# Patient Record
Sex: MALE | Race: WHITE
[De-identification: names, ages, dates, MRNs, and addresses within clinical notes are randomized per-mention and may not be internally consistent; named-entity substitution may affect disease eponyms.]

---

## 2018-12-06 NOTE — EDPHY
H & P


Stated Complaint: fall from bed-LBP


Time Seen by Provider: 12/06/18 06:12


HPI/ROS: 





HPI: The patient presents with a fall out of bed which occurred just prior to 

arrival.  He is brought in by ambulance from [facility].  He has multiple 

medical problems and was getting out of bed.  He slipped and fell and landed on 

his lumbar spine.  He had acute onset of severe pain which is achy, constant, 

midline.  He denies any numbness or tingling of his legs.  He normally gets 

around without any assistance though is deconditioned it seems.  Paramedics 

gave him 400 mcg of fentanyl which cause respiratory depression and sedation 

for which she required Narcan.





REVIEW OF SYSTEMS


10 systems were reviewed and negative with the exception of the elements 

mentioned in the history of present illness.





PMHx:  CHF, obstructive sleep apnea, on 2 L of oxygen at all times





TRAUMA PHYSICAL


General Appearance: Alert, no distress


Head: Atraumatic


Eyes: Pupils equal, round, reactive


Respiratory: No chest wall tenderness, no subcutaneous air, lungs clear 

bilaterally


Cardiovascular:  Regular rate and rhythm 


Abdomen:  Abdomen is soft and non-tender, pelvis stable


Skin:  No lacerations, No abrasion


Back:  Diffuse tenderness throughout the lumbar spine, paraspinal regions 

bilaterally


Extremities:  Non-tender, full range of motion 


Neurological:  A&Ox3, GCS=15,normal motor function with 5/5 strength in all 4 

extremities, normal sensory exam





Source: Patient, EMS, Old records


Exam Limitations: No limitations





- Personal History


Current Tetanus Diphtheria and Acellular Pertussis (TDAP): Unsure





- Medical/Surgical History


Hx Asthma: No


Hx Chronic Respiratory Disease: Yes


Hx Diabetes: No


Hx Cardiac Disease: Yes


Hx Renal Disease: No


Hx Cirrhosis: No


Hx Alcoholism: No


Hx HIV/AIDS: No


Hx Splenectomy or Spleen Trauma: No


Other PMH: CHF, LUNA, MDD





- Social History


Smoking Status: Never smoked


Constitutional: 


 Initial Vital Signs











Temperature (C)  36.6 C   12/06/18 05:43


 


Heart Rate  72   12/06/18 05:43


 


Respiratory Rate  16   12/06/18 05:43


 


Blood Pressure  138/98 H  12/06/18 05:43


 


O2 Sat (%)  94   12/06/18 05:43








 











O2 Delivery Mode               Room Air


 


O2 (L/minute)                  94














Allergies/Adverse Reactions: 


 





No Known Allergies Allergy (Verified 02/15/12 10:11)


 








Home Medications: 














 Medication  Instructions  Recorded


 


Hydrocodone Bit/Acetaminophen 1 - 2 tab PO Q4-6PRN #15 tab 02/15/12





[Vicodin 5/500]  


 


No Medications [NO HOME 1 ea MISC 02/15/12





MEDICATIONS]  


 


Penicillin V Potassium [Pen Vk 500 mg PO TID 30 Days  tab 02/15/12





500mg (*)]  














Medical Decision Making





- Diagnostics


Imaging Results: 


CT lumbar spine shows no acute fractures, discussed with Dr. Villagomez of Radiology


Imaging: Discussed imaging studies w/ On call Radiologist, I viewed and 

interpreted images myself


Differential Diagnosis: 





This is a 43-year-old man, with CHF, LUNA, morbid obesity, resident of [facility] who presents with a fall out of bed just prior to arrival.  He is 

complaining of lumbar spinal pain and has tenderness at the midline on exam.





Plan for CT scan of his lumbar spine here.  Patient's pain is well controlled 

currently.  No neurologic deficits.





CT scan is unremarkable for any acute pathology.  Patient is likely 

experiencing muscle pain and spasm.  He will be discharged back to [facility].





Departure





- Departure


Disposition: Home, Routine, Self-Care


Clinical Impression: 


 Fall from bed, initial encounter, Lumbar back pain





Condition: Good


Instructions:  Low Back Strain (ED)


Additional Instructions: 


Your x-rays today showed that you have no broken bones.  He would likely are 

suffering from muscle pain.  You should take ibuprofen or Tylenol as needed and 

use ice packs to help with this.

## 2019-01-06 NOTE — EDPHY
H & P


Time Seen by Provider: 01/06/19 21:27


HPI/ROS: 





CHIEF COMPLAINT:  Right lower extremity edema 





HISTORY OF PRESENT ILLNESS:  43-year-old male history of CHF, chronic home 

oxygen dependent, lives at [facility], arrives via ambulance for evaluation 

of 2 weeks of right lower extremity/calf swelling, pain.  Atraumatic.  The 

patient is by and large bed bound secondary to his obesity history as well as 

CHF history.  No chest pain.  No dyspnea.  No abdominal pain.  No syncope or 

near syncope.








REVIEW OF SYSTEMS:


10 systems reviewed and negative with the exception of the elements mentioned 

in the history of present illness








PAST MEDICAL & SURGICAL  HISTORY:  CHF.  Chronic home oxygen dependent





SOCIAL HISTORY:  Nonsmoker














************


PHYSICAL EXAM





(Prior to examination, patient consented to physical exam, hands were washed 

and my usual and customary physical exam procedures followed)


1) GENERAL: Obese, alert and oriented.  Appears to be in no acute distress.


2) HEAD: Normocephalic, atraumatic


3) HEENT: Pupils equal, round, reactive to light bilaterally.  Sclera 

anicteric.  Nasopharynx, oropharynx, clear, no lesions. Moist Mucous membranes.


4) NECK: Full range of motion, no meningeal signs.


5) LUNGS: Clear auscultation bilaterally, no wheezes, no rhonchi, no 

retractions.   


6) HEART: Regular rate and rhythm, no murmur, no heave, no gallop.


7) ABDOMEN: No guarding, no rebound, no focal tenderness,


8) MUSCULOSKELETAL: Right lower extremity:  Asymmetrical right lower extremity 

edema.  Normal color and temperature distally.  DP and PT pulses are 

appreciable.  Brisk capillary refill.  Soft compartments.  Dorsiflexion plantar 

flexion do not elicit pain.  No erythema.  Otherwise,  Moving all extremities, 

no focal areas of tenderness, no obvious trauma.  No peripheral edema or 

discoloration.


9) BACK: No CVA tenderness, no midline vertebral tenderness, no fluctuance, no 

step-off, no obvious trauma, no visual or palpable abnormality. 


10) SKIN: No rash, no petechiae. 


11) Psychiatric:  Patient is oriented X 3, there is no agitation.








***************





DIFFERENTIAL DIAGNOSIS:  In no particular order including but not limited to 

compartment syndrome, DVT, SVT, cellulitis 








- Medical/Surgical History


Hx Asthma: No


Hx Chronic Respiratory Disease: Yes


Hx Diabetes: No


Hx Cardiac Disease: Yes


Hx Renal Disease: No


Hx Cirrhosis: No


Hx Alcoholism: No


Hx HIV/AIDS: No


Hx Splenectomy or Spleen Trauma: No


Other PMH: CHF, LUNA, MDD





- Social History


Smoking Status: Never smoked


Constitutional: 


 Initial Vital Signs











Temperature (C)  37.2 C   01/06/19 21:30


 


Heart Rate  81   01/06/19 21:30


 


Respiratory Rate  20   01/06/19 21:30


 


Blood Pressure  157/84 H  01/06/19 21:30


 


O2 Sat (%)  90 L  01/06/19 21:30








 











O2 Delivery Mode               Nasal Cannula


 


O2 (L/minute)                  2














Allergies/Adverse Reactions: 


 





No Known Allergies Allergy (Verified 01/06/19 21:40)


 








Home Medications: 














 Medication  Instructions  Recorded


 


Hydrocodone Bit/Acetaminophen 1 - 2 tab PO Q4-6PRN #15 tab 02/15/12





[Vicodin 5/500]  


 


No Medications [NO HOME 1 ea MISC 02/15/12





MEDICATIONS]  


 


Penicillin V Potassium [Pen Vk 500 mg PO TID 30 Days  tab 02/15/12





500mg (*)]  


 


Diazepam [Valium 5 MG (*)] 5 mg PO TID PRN #15 tab 12/06/18


 


Diazepam [Valium 5 MG (*)] 5 mg PO TID PRN #15 tab 12/06/18


 


Ibuprofen 600 mg PO Q6H #30 tablet 12/06/18














Medical Decision Making





- Diagnostics


Imaging Results: 


 Imaging Impressions





Extremity Venous Study  01/06/19 21:32


Impression: There is no sonographic evidence of deep or superficial vein 

thrombosis in the right lower extremity.


 


Findings were discussed with NILO Snyder PA-C at 22:18, on 1/6/2019.


 








Images by myself


ED Course/Re-evaluation: 





10:38 p.m.:  Re-evaluation with serial exams.  Discussed his imaging results 

showing no DVT.  He has soft compartments.  Doubt arterial occlusion .  no 

evidence of ischemic limb.  No chest pain , no dyspnea.  Doubt acute CHF 

exacerbation.  Addition he has normal coloration no evidence of cellulitis, 

doubt cellulitis, doubt necrotizing fasciitis.  Patient was also seen and 

examined by Dr McCollester in the ER.  I specifically wrote down on the patient'

s discharge instructions that nursing staff need to check patient every few 

hours and if the patient develops erythema to the leg, develops a fever, or any 

other symptoms needs to seek immediate medical attention.











Departure





- Departure


Disposition: Home, Routine, Self-Care


Clinical Impression: 


 Right leg swelling





Condition: Good


Instructions:  Leg Edema (ED)


Additional Instructions: 


You need to have your legs rechecked every 6 hours. If you develop redness, 

firmness, fever, or any other symptoms that concern you. 


Referrals: 


Radha Corona,  [Doctor of Osteopathy] - As per Instructions

## 2019-06-18 ENCOUNTER — HOSPITAL ENCOUNTER (EMERGENCY)
Dept: HOSPITAL 80 - FED | Age: 44
Discharge: HOME | End: 2019-06-18
Payer: MEDICAID